# Patient Record
Sex: FEMALE | URBAN - METROPOLITAN AREA
[De-identification: names, ages, dates, MRNs, and addresses within clinical notes are randomized per-mention and may not be internally consistent; named-entity substitution may affect disease eponyms.]

---

## 2022-02-09 ENCOUNTER — LAB OUTSIDE AN ENCOUNTER (OUTPATIENT)
Dept: URBAN - METROPOLITAN AREA CLINIC 115 | Facility: CLINIC | Age: 37
End: 2022-02-09

## 2022-02-09 ENCOUNTER — OFFICE VISIT (OUTPATIENT)
Dept: URBAN - METROPOLITAN AREA CLINIC 115 | Facility: CLINIC | Age: 37
End: 2022-02-09
Payer: COMMERCIAL

## 2022-02-09 DIAGNOSIS — R10.13 EPIGASTRIC ABDOMINAL PAIN: ICD-10-CM

## 2022-02-09 DIAGNOSIS — K58.8 OTHER IRRITABLE BOWEL SYNDROME: ICD-10-CM

## 2022-02-09 DIAGNOSIS — N02.8 IGA NEPHROPATHY: ICD-10-CM

## 2022-02-09 DIAGNOSIS — K21.9 GASTROESOPHAGEAL REFLUX DISEASE, UNSPECIFIED WHETHER ESOPHAGITIS PRESENT: ICD-10-CM

## 2022-02-09 PROBLEM — 236407003: Status: ACTIVE | Noted: 2022-02-09

## 2022-02-09 PROBLEM — 10743008: Status: ACTIVE | Noted: 2022-02-09

## 2022-02-09 PROBLEM — 235595009: Status: ACTIVE | Noted: 2022-02-09

## 2022-02-09 PROBLEM — 79922009: Status: ACTIVE | Noted: 2022-02-09

## 2022-02-09 PROCEDURE — 99244 OFF/OP CNSLTJ NEW/EST MOD 40: CPT | Performed by: INTERNAL MEDICINE

## 2022-02-09 PROCEDURE — 99204 OFFICE O/P NEW MOD 45 MIN: CPT | Performed by: INTERNAL MEDICINE

## 2022-02-09 RX ORDER — SUCRALFATE 1 G
1 TABLET ON AN EMPTY STOMACH TABLET ORAL TWICE A DAY
Qty: 180 TABLET | Refills: 0 | OUTPATIENT
Start: 2022-02-09 | End: 2022-05-10

## 2022-02-09 RX ORDER — GABAPENTIN 400 MG/1
1 CAPSULE CAPSULE ORAL ONCE A DAY
Status: ACTIVE | COMMUNITY

## 2022-02-09 RX ORDER — METOPROLOL TARTRATE 50 MG/1
1 TABLET WITH FOOD TABLET ORAL TWICE A DAY
Status: ACTIVE | COMMUNITY

## 2022-02-09 RX ORDER — LORATADINE 10 MG/1
1 TABLET TABLET ORAL ONCE A DAY
Status: ACTIVE | COMMUNITY

## 2022-02-09 RX ORDER — PANTOPRAZOLE SODIUM 40 MG/1
1 TABLET TABLET, DELAYED RELEASE ORAL ONCE A DAY
Status: ACTIVE | COMMUNITY

## 2022-02-09 RX ORDER — PREGABALIN 25 MG/1
1 CAPSULE CAPSULE ORAL ONCE A DAY
Status: ACTIVE | COMMUNITY

## 2022-02-09 RX ORDER — PANTOPRAZOLE 40 MG/1
1 TABLET TABLET, DELAYED RELEASE ORAL TWICE A DAY
Qty: 180 TABLET | Refills: 0 | OUTPATIENT
Start: 2022-02-09

## 2022-02-09 NOTE — HPI-TODAY'S VISIT:
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  Patient stated she been on several medications steroids pain medications Lortab because of severe degenerative back and joint issues for which he feels is doing she uses narcotics in a week as needed basis.  Complaining of having throat burning sensation.  Frequent belching and burping after eating food and she has been avoiding all kinds of spicy food and red sauces.  Denies any melena.  She had renal biopsy and was diagnosed with IgA nephropathy Gunter she reports having pedal edema.  She is currently on Protonix with still ongoing upper GI symptoms.  She also have complaints of having some rhythm abnormalities and seeing cardiologist and she is currently on Holter.

## 2023-01-13 ENCOUNTER — P2P PATIENT RECORD (OUTPATIENT)
Age: 38
End: 2023-01-13

## 2023-03-22 ENCOUNTER — LAB OUTSIDE AN ENCOUNTER (OUTPATIENT)
Dept: URBAN - METROPOLITAN AREA CLINIC 115 | Facility: CLINIC | Age: 38
End: 2023-03-22

## 2023-03-22 ENCOUNTER — OFFICE VISIT (OUTPATIENT)
Dept: URBAN - METROPOLITAN AREA CLINIC 115 | Facility: CLINIC | Age: 38
End: 2023-03-22
Payer: COMMERCIAL

## 2023-03-22 VITALS
HEART RATE: 69 BPM | WEIGHT: 183 LBS | TEMPERATURE: 97.8 F | HEIGHT: 55 IN | DIASTOLIC BLOOD PRESSURE: 75 MMHG | SYSTOLIC BLOOD PRESSURE: 103 MMHG | BODY MASS INDEX: 42.35 KG/M2

## 2023-03-22 DIAGNOSIS — K58.9 IBS (IRRITABLE BOWEL SYNDROME): ICD-10-CM

## 2023-03-22 DIAGNOSIS — K21.9 GASTROESOPHAGEAL REFLUX DISEASE, UNSPECIFIED WHETHER ESOPHAGITIS PRESENT: ICD-10-CM

## 2023-03-22 DIAGNOSIS — N02.8 IGA NEPHROPATHY: ICD-10-CM

## 2023-03-22 DIAGNOSIS — R10.13 EPIGASTRIC ABDOMINAL PAIN: ICD-10-CM

## 2023-03-22 PROCEDURE — 99214 OFFICE O/P EST MOD 30 MIN: CPT | Performed by: INTERNAL MEDICINE

## 2023-03-22 RX ORDER — METOPROLOL TARTRATE 50 MG/1
1 TABLET WITH FOOD TABLET ORAL TWICE A DAY
Status: ON HOLD | COMMUNITY

## 2023-03-22 RX ORDER — GABAPENTIN 400 MG/1
1 CAPSULE CAPSULE ORAL ONCE A DAY
Status: ACTIVE | COMMUNITY

## 2023-03-22 RX ORDER — LORATADINE 10 MG/1
1 TABLET TABLET ORAL ONCE A DAY
Status: ACTIVE | COMMUNITY

## 2023-03-22 RX ORDER — PREGABALIN 25 MG/1
1 CAPSULE CAPSULE ORAL ONCE A DAY
Status: ACTIVE | COMMUNITY

## 2023-03-22 RX ORDER — BELIMUMAB 200 MG/ML
1 ML SOLUTION SUBCUTANEOUS
Status: ACTIVE | COMMUNITY
Start: 2023-03-22

## 2023-03-22 RX ORDER — PANTOPRAZOLE 40 MG/1
1 TABLET TABLET, DELAYED RELEASE ORAL TWICE A DAY
Qty: 180 TABLET | Refills: 0 | OUTPATIENT

## 2023-03-22 RX ORDER — PANTOPRAZOLE 40 MG/1
1 TABLET TABLET, DELAYED RELEASE ORAL TWICE A DAY
Qty: 180 TABLET | Refills: 0 | Status: ON HOLD | COMMUNITY
Start: 2022-02-09

## 2023-03-22 RX ORDER — FAMOTIDINE 40 MG/1
1 TABLET AT BEDTIME TABLET, FILM COATED ORAL ONCE A DAY
Status: ACTIVE | COMMUNITY
Start: 2023-03-22

## 2023-03-22 RX ORDER — PANTOPRAZOLE SODIUM 40 MG/1
1 TABLET TABLET, DELAYED RELEASE ORAL ONCE A DAY
Status: ACTIVE | COMMUNITY

## 2023-03-22 NOTE — HPI-TODAY'S VISIT:
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  Patient stated she been on several medications steroids pain medications Lortab because of severe degenerative back and joint issues for which he feels is doing she uses narcotics in a week as needed basis.  Complaining of having throat burning sensation.  Frequent belching and burping after eating food and she has been avoiding all kinds of spicy food and red sauces.  Denies any melena.  She had renal biopsy and was diagnosed with IgA nephropathy Gunter she reports having pedal edema.  She is currently on Protonix with still ongoing upper GI symptoms.  She also have complaints of having some rhythm abnormalities and seeing cardiologist and she is currently on Holter.  3/22/23: Altagracia Krueger is a 37-year-old female patient came into the office for the evaluation of having abdominal bloating reflux regurgitation as well as multiple symptoms.  Patient is known to have history of IgA nephropathy.  Patient also noted to have history of irregular heartbeat and arrhythmia and cardiologist had recommended ablation which she has not scheduled it.  Patient also stated that she sees [facility] and follows with them closely.  Patient reports history of lupus and has been on IV infusions Biologics.  Patient reports having some relief of her GI symptoms of reflux abdominal bloating when she eats a gluten-free diet.  Patient denies any diarrhea.  And complaint is epigastric pain as well as abdominal bloating and distention.  She also noticed having significant fullness and nausea several hours after eating food.  Admits for having stress and anxiety.  Denies any significant weight loss.

## 2023-03-30 ENCOUNTER — OFFICE VISIT (OUTPATIENT)
Dept: URBAN - METROPOLITAN AREA CLINIC 114 | Facility: CLINIC | Age: 38
End: 2023-03-30
Payer: COMMERCIAL

## 2023-03-30 DIAGNOSIS — K76.89 LIVER CYST: ICD-10-CM

## 2023-03-30 PROCEDURE — 76705 ECHO EXAM OF ABDOMEN: CPT | Performed by: INTERNAL MEDICINE

## 2023-04-17 ENCOUNTER — TELEPHONE ENCOUNTER (OUTPATIENT)
Dept: URBAN - METROPOLITAN AREA CLINIC 115 | Facility: CLINIC | Age: 38
End: 2023-04-17

## 2023-04-24 ENCOUNTER — TELEPHONE ENCOUNTER (OUTPATIENT)
Dept: URBAN - METROPOLITAN AREA CLINIC 92 | Facility: CLINIC | Age: 38
End: 2023-04-24

## 2023-05-31 ENCOUNTER — DASHBOARD ENCOUNTERS (OUTPATIENT)
Age: 38
End: 2023-05-31

## 2023-05-31 ENCOUNTER — CLAIMS CREATED FROM THE CLAIM WINDOW (OUTPATIENT)
Dept: URBAN - METROPOLITAN AREA CLINIC 115 | Facility: CLINIC | Age: 38
End: 2023-05-31
Payer: COMMERCIAL

## 2023-05-31 VITALS
DIASTOLIC BLOOD PRESSURE: 71 MMHG | HEART RATE: 71 BPM | SYSTOLIC BLOOD PRESSURE: 104 MMHG | WEIGHT: 187 LBS | HEIGHT: 55 IN | BODY MASS INDEX: 43.28 KG/M2 | TEMPERATURE: 97.7 F

## 2023-05-31 DIAGNOSIS — N02.8 IGA NEPHROPATHY: ICD-10-CM

## 2023-05-31 DIAGNOSIS — K58.9 IRRITABLE BOWEL SYNDROME, UNSPECIFIED TYPE: ICD-10-CM

## 2023-05-31 DIAGNOSIS — K21.9 GASTROESOPHAGEAL REFLUX DISEASE, UNSPECIFIED WHETHER ESOPHAGITIS PRESENT: ICD-10-CM

## 2023-05-31 PROCEDURE — 99214 OFFICE O/P EST MOD 30 MIN: CPT | Performed by: INTERNAL MEDICINE

## 2023-05-31 RX ORDER — PANTOPRAZOLE 40 MG/1
1 TABLET TABLET, DELAYED RELEASE ORAL TWICE A DAY
Qty: 180 TABLET | Refills: 0 | Status: ON HOLD | COMMUNITY

## 2023-05-31 RX ORDER — LORATADINE 10 MG/1
1 TABLET TABLET ORAL ONCE A DAY
Status: ACTIVE | COMMUNITY

## 2023-05-31 RX ORDER — METOPROLOL TARTRATE 50 MG/1
1 TABLET WITH FOOD TABLET ORAL TWICE A DAY
Status: ON HOLD | COMMUNITY

## 2023-05-31 RX ORDER — BELIMUMAB 200 MG/ML
1 ML SOLUTION SUBCUTANEOUS
Status: ACTIVE | COMMUNITY
Start: 2023-03-22

## 2023-05-31 RX ORDER — CHOLECALCIFEROL (VITAMIN D3) 1MM UNIT/G
AS DIRECTED LIQUID (ML) MISCELLANEOUS
Status: ACTIVE | COMMUNITY

## 2023-05-31 RX ORDER — PANTOPRAZOLE 40 MG/1
1 TABLET TABLET, DELAYED RELEASE ORAL TWICE A DAY
OUTPATIENT

## 2023-05-31 RX ORDER — DEXLANSOPRAZOLE 60 MG/1
1 CAPSULE CAPSULE, DELAYED RELEASE ORAL ONCE A DAY
Qty: 90 CAPSULE | Refills: 1 | OUTPATIENT
Start: 2023-05-31

## 2023-05-31 RX ORDER — PREGABALIN 25 MG/1
1 CAPSULE CAPSULE ORAL ONCE A DAY
Status: ACTIVE | COMMUNITY

## 2023-05-31 RX ORDER — FAMOTIDINE 40 MG/1
1 TABLET AT BEDTIME TABLET, FILM COATED ORAL ONCE A DAY
Status: ACTIVE | COMMUNITY
Start: 2023-03-22

## 2023-05-31 RX ORDER — PANTOPRAZOLE SODIUM 40 MG/1
1 TABLET TABLET, DELAYED RELEASE ORAL ONCE A DAY
Status: ACTIVE | COMMUNITY

## 2023-05-31 RX ORDER — GABAPENTIN 400 MG/1
1 CAPSULE CAPSULE ORAL ONCE A DAY
Status: ACTIVE | COMMUNITY

## 2023-05-31 RX ORDER — FAMOTIDINE 40 MG/1
1 TABLET AT BEDTIME TABLET, FILM COATED ORAL ONCE A DAY
Qty: 90 TABLET | Refills: 1 | OUTPATIENT
Start: 2023-05-31

## 2023-05-31 NOTE — HPI-TODAY'S VISIT:
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  Patient stated she been on several medications steroids pain medications Lortab because of severe degenerative back and joint issues for which he feels is doing she uses narcotics in a week as needed basis.  Complaining of having throat burning sensation.  Frequent belching and burping after eating food and she has been avoiding all kinds of spicy food and red sauces.  Denies any melena.  She had renal biopsy and was diagnosed with IgA nephropathy Gunter she reports having pedal edema.  She is currently on Protonix with still ongoing upper GI symptoms.  She also have complaints of having some rhythm abnormalities and seeing cardiologist and she is currently on Holter.  3/22/23: Altagraica Krueger is a 37-year-old female patient came into the office for the evaluation of having abdominal bloating reflux regurgitation as well as multiple symptoms.  Patient is known to have history of IgA nephropathy.  Patient also noted to have history of irregular heartbeat and arrhythmia and cardiologist had recommended ablation which she has not scheduled it.  Patient also stated that she sees [facility] and follows with them closely.  Patient reports history of lupus and has been on IV infusions Biologics.  Patient reports having some relief of her GI symptoms of reflux abdominal bloating when she eats a gluten-free diet.  Patient denies any diarrhea.  And complaint is epigastric pain as well as abdominal bloating and distention.  She also noticed having significant fullness and nausea several hours after eating food.  Admits for having stress and anxiety.  Denies any significant weight loss.  5/31/23 : Ms. Krueger is a 37-year-old female came into the office for the follow-up for complaints of having abdominal bloating and distention.  Currently she is on Protonix once daily as well as famotidine with still ongoing symptoms of significant reflux and regurgitation of the symptoms.  Patient has significant cardiac comorbidities and hence upper endoscopy was not offered.  Patient is waiting to see cardiologist.  She reports having the other noninvasive GI work-up that included gastric emptying scan upper GI series barium swallow which were all unremarkable.  Right upper quadrant ultrasound showed possible fatty liver.  Patient was not noted to have any stricture in the esophagus.  Patient has been following up with renal as well as cardiology.  She reports having regurgitation of the food material when she eats causing her symptoms cough and sore throat.

## 2023-06-09 ENCOUNTER — OFFICE VISIT (OUTPATIENT)
Dept: URBAN - METROPOLITAN AREA CLINIC 115 | Facility: CLINIC | Age: 38
End: 2023-06-09

## 2023-11-05 NOTE — PHYSICAL EXAM NECK/THYROID:
normal appearance , without tenderness upon palpation , no deformities , trachea midline , Thyroid normal size , no thyroid nodules , no masses , no JVD , thyroid nontender PAST MEDICAL HISTORY:  Herpes

## 2024-06-28 ENCOUNTER — TELEPHONE ENCOUNTER (OUTPATIENT)
Dept: URBAN - METROPOLITAN AREA CLINIC 115 | Facility: CLINIC | Age: 39
End: 2024-06-28